# Patient Record
Sex: MALE | Race: WHITE | Employment: FULL TIME | URBAN - METROPOLITAN AREA
[De-identification: names, ages, dates, MRNs, and addresses within clinical notes are randomized per-mention and may not be internally consistent; named-entity substitution may affect disease eponyms.]

---

## 2018-05-14 PROCEDURE — 88304 TISSUE EXAM BY PATHOLOGIST: CPT | Performed by: SURGERY

## 2021-10-02 ENCOUNTER — LAB ENCOUNTER (OUTPATIENT)
Dept: LAB | Facility: HOSPITAL | Age: 51
End: 2021-10-02
Attending: INTERNAL MEDICINE
Payer: COMMERCIAL

## 2021-10-02 DIAGNOSIS — Z01.818 PRE-OP TESTING: ICD-10-CM

## 2021-10-05 PROBLEM — D12.2 BENIGN NEOPLASM OF ASCENDING COLON: Status: ACTIVE | Noted: 2021-10-05

## 2021-10-05 PROBLEM — Z12.11 SPECIAL SCREENING FOR MALIGNANT NEOPLASM OF COLON: Status: ACTIVE | Noted: 2021-10-05

## 2021-10-05 PROBLEM — D12.5 BENIGN NEOPLASM OF SIGMOID COLON: Status: ACTIVE | Noted: 2021-10-05

## 2022-03-21 ENCOUNTER — TELEMEDICINE (OUTPATIENT)
Dept: INTERNAL MEDICINE CLINIC | Facility: CLINIC | Age: 52
End: 2022-03-21
Payer: COMMERCIAL

## 2022-03-21 DIAGNOSIS — L03.114 CELLULITIS OF LEFT HAND: Primary | ICD-10-CM

## 2022-03-21 PROBLEM — D12.5 BENIGN NEOPLASM OF SIGMOID COLON: Status: RESOLVED | Noted: 2021-10-05 | Resolved: 2022-03-21

## 2022-03-21 PROBLEM — Z86.0100 HISTORY OF COLON POLYPS: Status: ACTIVE | Noted: 2022-03-21

## 2022-03-21 PROBLEM — Z12.11 SPECIAL SCREENING FOR MALIGNANT NEOPLASM OF COLON: Status: RESOLVED | Noted: 2021-10-05 | Resolved: 2022-03-21

## 2022-03-21 PROBLEM — D12.2 BENIGN NEOPLASM OF ASCENDING COLON: Status: RESOLVED | Noted: 2021-10-05 | Resolved: 2022-03-21

## 2022-03-21 PROBLEM — Z86.010 HISTORY OF COLON POLYPS: Status: ACTIVE | Noted: 2022-03-21

## 2022-03-21 PROCEDURE — 99203 OFFICE O/P NEW LOW 30 MIN: CPT | Performed by: INTERNAL MEDICINE

## 2022-03-21 RX ORDER — CEPHALEXIN 500 MG/1
500 CAPSULE ORAL 3 TIMES DAILY
Qty: 21 CAPSULE | Refills: 0 | Status: SHIPPED | OUTPATIENT
Start: 2022-03-21

## 2022-03-24 ENCOUNTER — TELEPHONE (OUTPATIENT)
Dept: INTERNAL MEDICINE CLINIC | Facility: CLINIC | Age: 52
End: 2022-03-24

## 2022-03-24 RX ORDER — PREDNISONE 20 MG/1
40 TABLET ORAL DAILY
Qty: 10 TABLET | Refills: 0 | Status: SHIPPED | OUTPATIENT
Start: 2022-03-24 | End: 2022-03-29

## 2022-03-24 RX ORDER — DOXYCYCLINE HYCLATE 100 MG/1
100 CAPSULE ORAL 2 TIMES DAILY
Qty: 14 CAPSULE | Refills: 0 | Status: SHIPPED | OUTPATIENT
Start: 2022-03-24 | End: 2022-03-31

## 2022-03-24 NOTE — TELEPHONE ENCOUNTER
Pt had a virtual visit earlier this week for cellulitis   Took meds for it beginning Monday. Now patient has a significant rash on his face      Please call to advise.

## 2022-03-24 NOTE — TELEPHONE ENCOUNTER
Spoke to pt, cellulitis has gotten better, swelling and redness has improved, not completely gone but developed red spots/rash on his face.   Dr. Pereyra Manual recommended starting prednisone for allergic reaction and change ABT to Doxycycline  Notified pt, understanding verbalized  Allergy noted to Keflex

## 2023-07-22 ENCOUNTER — ORDER TRANSCRIPTION (OUTPATIENT)
Dept: ADMINISTRATIVE | Facility: HOSPITAL | Age: 53
End: 2023-07-22

## 2023-07-22 DIAGNOSIS — Z13.6 SCREENING FOR CARDIOVASCULAR CONDITION: Primary | ICD-10-CM

## 2023-07-24 ENCOUNTER — HOSPITAL ENCOUNTER (OUTPATIENT)
Dept: CT IMAGING | Facility: HOSPITAL | Age: 53
End: 2023-07-24
Attending: INTERNAL MEDICINE

## 2023-07-24 VITALS — DIASTOLIC BLOOD PRESSURE: 60 MMHG | SYSTOLIC BLOOD PRESSURE: 100 MMHG

## 2023-07-24 DIAGNOSIS — Z13.6 SCREENING FOR CARDIOVASCULAR CONDITION: ICD-10-CM

## 2023-07-24 PROBLEM — I77.810 DILATATION OF THORACIC AORTA: Status: ACTIVE | Noted: 2023-07-24

## 2023-07-24 PROBLEM — I77.810 DILATATION OF THORACIC AORTA (HCC): Status: ACTIVE | Noted: 2023-07-24

## 2023-07-24 NOTE — PROGRESS NOTES
Date of Service 7/24/2023    COTREZ CHURCH  Date of Birth 7/8/1970    Patient Age: 48year old    PCP: Kristy Zuniga MD  [address] [address]    Heart Scan Consult  Preliminary Heart Scan Score: 0    Previous Screening  Heart Scan Completed Previously: No                   Risk Factors  Personal Risk Factors  Non-alterable Risk Factors: Personal History;Age;Family History;Gender          Blood Pressure     /60 (BP Location: Right arm)     (Normal =< 120/80,  Elevated = 120-129/ >80,  High Stage1 130-139/80-89 , Stage2 >140/>90)    Lipid Profile  Cholesterol: 191, done on 11/30/2021. HDL Cholesterol: 69, done on 11/30/2021. LDL Cholesterol: 106, done on 11/30/2021. TriGlycerides 79, done on 11/30/2021. Cholesterol Goals  Value   Total  =< 200   HDL  = > 45 Men = > 55 Women   LDL   =< 100   Triglycerides  =< 150       Glucose and Hemoglobin A1C  Lab Results   Component Value Date     (H) 11/30/2021    A1C 6.8 (H) 11/30/2021     (Normal Fasting Glucose < 100mg/dl )    Nurse Review  Risk factor information and results reviewed with Nurse: Yes    Recommended Follow Up:  Consult your physician regarding[de-identified] Final Heart Scan Report; Discuss potential for Incidental Finding      Recommendations for Change:  Nutrition Changes: Low Saturated Fat;Low Fat Dairy; Increase Fiber    Cholesterol Modification (goal of therapy depends upon your risk): No Change Needed     (Today's FASTING Cholestech Values: Total Cholesterol-165, HDL-72, LDL-83, Triglycerides-52, Glucose-90)    Exercise: Enhance Current Program                   Repeat Heart Scan: 5 years if Calcium Score is 0.0         Other[de-identified] Your Cholesterol values look great! Keep up the good work Ethan Wesley! Jaime Recommended Resources:  Recommended Resources: Upcoming Classes, Medical Services and [facility]. Health. Chey Potts RN        Please Contact the Nurse Heart Line with any Questions or Concerns [phone].

## 2023-08-07 ENCOUNTER — TELEPHONE (OUTPATIENT)
Dept: OTHER | Facility: HOSPITAL | Age: 53
End: 2023-08-07

## 2023-08-07 NOTE — PROGRESS NOTES
Pt had a heart scan on 7/24/2023 with a score of zero. Incidental finding noted of dilated thoracic aorta of 4.1cm. Call to pt to discuss. Received a VM. LMTCB.

## 2024-01-28 ENCOUNTER — MOBILE ENCOUNTER (OUTPATIENT)
Dept: INTERNAL MEDICINE CLINIC | Facility: CLINIC | Age: 54
End: 2024-01-28

## 2024-01-28 DIAGNOSIS — L73.9 FOLLICULITIS: Primary | ICD-10-CM

## 2024-01-28 RX ORDER — DOXYCYCLINE HYCLATE 100 MG/1
100 CAPSULE ORAL 2 TIMES DAILY
Qty: 20 CAPSULE | Refills: 0 | Status: SHIPPED | OUTPATIENT
Start: 2024-01-28

## 2024-10-18 ENCOUNTER — TELEPHONE (OUTPATIENT)
Dept: INTERNAL MEDICINE CLINIC | Facility: CLINIC | Age: 54
End: 2024-10-18

## 2024-10-18 DIAGNOSIS — E10.29 TYPE 1 DIABETES MELLITUS WITH MICROALBUMINURIA (HCC): ICD-10-CM

## 2024-10-18 DIAGNOSIS — R80.9 TYPE 1 DIABETES MELLITUS WITH MICROALBUMINURIA (HCC): ICD-10-CM

## 2024-10-18 DIAGNOSIS — Z12.5 PROSTATE CANCER SCREENING: ICD-10-CM

## 2024-10-18 DIAGNOSIS — Z00.00 LABORATORY EXAMINATION ORDERED AS PART OF A ROUTINE GENERAL MEDICAL EXAMINATION: Primary | ICD-10-CM

## 2024-10-18 NOTE — TELEPHONE ENCOUNTER
Preferred Lab: [facility]  LOV: 03/21/2022  Next OV & Reason:  10/24/2024-physical    Patient was notified to fast 8-10 hours drinking only water/black coffee prior to having labs drawn and may need to submit urine sample.  Hemoglobin A1C will be ordered if patient has diabetes or prediabetes.  Lab orders will be placed by end of day:  yes   Patient requesting A1C: yes  Patient informed insurance may not cover A1C and they may be responsible for cost if denied by insurance:  yes  Patient requesting return call:  no

## 2024-10-24 ENCOUNTER — OFFICE VISIT (OUTPATIENT)
Dept: INTERNAL MEDICINE CLINIC | Facility: CLINIC | Age: 54
End: 2024-10-24
Payer: COMMERCIAL

## 2024-10-24 VITALS
RESPIRATION RATE: 16 BRPM | WEIGHT: 186 LBS | HEART RATE: 82 BPM | SYSTOLIC BLOOD PRESSURE: 114 MMHG | HEIGHT: 73 IN | TEMPERATURE: 98 F | OXYGEN SATURATION: 99 % | BODY MASS INDEX: 24.65 KG/M2 | DIASTOLIC BLOOD PRESSURE: 72 MMHG

## 2024-10-24 DIAGNOSIS — R80.9 TYPE 1 DIABETES MELLITUS WITH MICROALBUMINURIA (HCC): ICD-10-CM

## 2024-10-24 DIAGNOSIS — E10.29 TYPE 1 DIABETES MELLITUS WITH MICROALBUMINURIA (HCC): ICD-10-CM

## 2024-10-24 DIAGNOSIS — Z23 NEED FOR INFLUENZA VACCINATION: ICD-10-CM

## 2024-10-24 DIAGNOSIS — Z23 NEED FOR PNEUMOCOCCAL VACCINATION: ICD-10-CM

## 2024-10-24 DIAGNOSIS — Z00.00 ANNUAL PHYSICAL EXAM: Primary | ICD-10-CM

## 2024-10-24 DIAGNOSIS — I77.810 DILATATION OF THORACIC AORTA (HCC): ICD-10-CM

## 2024-10-24 PROCEDURE — 90677 PCV20 VACCINE IM: CPT | Performed by: INTERNAL MEDICINE

## 2024-10-24 PROCEDURE — 90472 IMMUNIZATION ADMIN EACH ADD: CPT | Performed by: INTERNAL MEDICINE

## 2024-10-24 PROCEDURE — 90471 IMMUNIZATION ADMIN: CPT | Performed by: INTERNAL MEDICINE

## 2024-10-24 PROCEDURE — 90656 IIV3 VACC NO PRSV 0.5 ML IM: CPT | Performed by: INTERNAL MEDICINE

## 2024-10-24 PROCEDURE — 99396 PREV VISIT EST AGE 40-64: CPT | Performed by: INTERNAL MEDICINE

## 2024-10-24 RX ORDER — ATORVASTATIN CALCIUM 20 MG/1
20 TABLET, FILM COATED ORAL DAILY
Qty: 90 TABLET | Refills: 3 | Status: SHIPPED | OUTPATIENT
Start: 2024-10-24

## 2024-10-24 RX ORDER — LEVOTHYROXINE SODIUM 137 UG/1
137 TABLET ORAL
COMMUNITY

## 2024-10-24 NOTE — PROGRESS NOTES
Subjective:   Patient ID: Kali Mehta is a 54 year old male.    HPI  Kali Mehta is a 54 year old male who presents for a complete physical exam.   HPI:   Pt complains of nothing  Sees endo for DM1 management. [facility]  He hasn't been taking statin for months.  Optho at [facility]  Denies cp or sob    Had recent CT utrafast:  On overread:  Impression   CONCLUSION:  Fusiform dilation of the ascending thoracic aorta with caliber of 4.1 cm.        PAST MEDICAL, SOCIAL, FAMILY HISTORIES REVIEWED WITH PT  .    Immunization History   Administered Date(s) Administered    Covid-19 Vaccine Moderna 100 mcg/0.5 ml 03/15/2021, 04/12/2021, 10/27/2021    Covid-19 Vaccine Pfizer Bivalent 30mcg/0.3mL 11/14/2022    FLU VAC QIV SPLIT 3 YRS AND OLDER (45672) 11/17/2017    FLUZONE 6 months and older PFS 0.5 ml (10535) 11/27/2022    Fluvirin, 3 Years & >, Im 10/27/2021    Influenza 11/27/2022    TDAP 11/08/2017     Wt Readings from Last 6 Encounters:   10/07/24 185 lb (83.9 kg)   02/02/22 190 lb (86.2 kg)   11/30/21 190 lb (86.2 kg)   10/01/21 188 lb (85.3 kg)   11/11/19 190 lb 8 oz (86.4 kg)   08/31/18 189 lb 4 oz (85.8 kg)     There is no height or weight on file to calculate BMI.     Lab Results   Component Value Date     (H) 11/30/2021     (H) 08/31/2018     Lab Results   Component Value Date    CHOLEST 191.00 11/30/2021     Lab Results   Component Value Date    HDL 69 11/30/2021     Lab Results   Component Value Date     11/30/2021     Lab Results   Component Value Date    AST 30 11/30/2021    AST 28 08/31/2018     Lab Results   Component Value Date    ALT 14 11/30/2021    ALT 23 08/31/2018     Lab Results   Component Value Date    PSA 0.850 11/30/2021    PSA 0.80 08/31/2018        Current Outpatient Medications   Medication Sig Dispense Refill    PEG 3350-KCl-Na Bicarb-NaCl 420 g Oral Recon Soln Take as directed by physician 4000 mL 0    doxycycline 100 MG Oral Cap Take 1 capsule (100 mg  total) by mouth 2 (two) times daily. 20 capsule 0    Continuous Blood Gluc Transmit (DEXCOM G6 TRANSMITTER) Does not apply Misc Use one every 90 days 1 each 3    Continuous Blood Gluc Sensor (DEXCOM G6 SENSOR) Does not apply Misc 1 each Every 10 days. 9 each 3    LEVOTHYROXINE 150 MCG Oral Tab TAKE 1 TABLET BY MOUTH DAILY 90 tablet 3    FREESTYLE TEST In Vitro Strip TEST BLOOD GLUCOSE LEVEL 6 TIMES DAILY 200 strip 3    Glucagon (GVOKE HYPOPEN 2-PACK) 1 MG/0.2ML Subcutaneous Solution Auto-injector Inject 1 mg into the skin as needed. 0.4 mL 3    insulin glargine (LANTUS) 100 UNIT/ML Subcutaneous Solution 20 units daily as needed for pump malfunction 10 mL 3    Insulin Syringes, Disposable, U-100 1 ML Does not apply Misc As needed for pump malfunction 100 each 3    Insulin Lispro (HUMALOG) 100 UNIT/ML Subcutaneous Solution 50u daily through pump 10 mL 3    Fluticasone Propionate (FLONASE) 50 MCG/ACT Nasal Suspension 2 sprays by Nasal route daily. 1 Bottle 3    atorvastatin 10 MG Oral Tab Take 1 tablet (10 mg total) by mouth daily.        Past Medical History:    DIABETES    Type 1    Diabetes mellitus (HCC)    Fatigue    Hearing loss    Hemorrhoids    HYPOTHYROIDISM    Thyroid disease    Wears glasses      Past Surgical History:   Procedure Laterality Date    Colonoscopy      Tonsillectomy        Family History   Problem Relation Age of Onset    No Known Problems Father     Ulcerative Colitis Mother     Diabetes Maternal Grandmother     No Known Problems Paternal Grandmother     No Known Problems Brother     No Known Problems Brother     Alcohol and Other Disorders Associated Maternal Uncle     Cancer Neg     Heart Disorder Neg     Hypertension Neg     Lipids Neg     Obesity Neg     Psychiatric Neg       Social History:  Social History     Socioeconomic History    Marital status:    Tobacco Use    Smoking status: Never    Smokeless tobacco: Never   Vaping Use    Vaping status: Never Used   Substance and Sexual  Activity    Alcohol use: Yes     Alcohol/week: 6.0 standard drinks of alcohol     Types: 6 Cans of beer per week     Comment: social     Drug use: No      Occ: yes. : yes. Children: yes.   Exercise: three times per week.  Diet: watches fats closely and watches sugar closely     REVIEW OF SYSTEMS:   A comprehensive 10 point review of systems was completed.     Pertinent positives and negatives noted in the HPI.      EXAM:   There were no vitals taken for this visit.  There is no height or weight on file to calculate BMI.   GENERAL: well developed, well nourished,in no apparent distress  SKIN: no rashes,no suspicious lesions  HEENT: atraumatic, normocephalic,ears and throat are clear  EYES:PERRLA, EOMI,,conjunctiva are clear  NECK: supple,no adenopathy,no bruits  LUNGS: clear to auscultation  CARDIO: RRR without murmur  GI: good BS's,no masses, HSM or tenderness  : deferred  MUSCULOSKELETAL: back is not tender  EXTREMITIES: no cyanosis, clubbing or edema  NEURO: Oriented times three,motor and sensory are grossly intact, Bilateral barefoot skin diabetic exam is normal, visualized feet and the appearance is normal.  Bilateral monofilament/sensation of both feet is normal.  Pulsation pedal pulse exam of both lower legs/feet is normal as well.      ASSESSMENT AND PLAN:   Kali Mehta is a 54 year old male who presents for a complete physical exam.  There is no height or weight on file to calculate BMI., recommended low fat diet and aerobic exercise 30 minutes three times weekly.   Health maintenance, will check fasting Lipids, CMP, CBC and PSA and A1c   Pt referred for screening colonoscopy.     DM1- controlled. Cont follow up with  [facility]. Restart atorvastatin  Refer to cardio regarding descending thoracic aorta dilation, check CTA chest and ECHO     Update prevnar  Pt info handouts given for: exercise, low fat diet  The patient indicates understanding of these issues and agrees to the plan.    The patient is  asked to return for CPX in 12 m.    History/Other:   Review of Systems  Current Outpatient Medications   Medication Sig Dispense Refill    PEG 3350-KCl-Na Bicarb-NaCl 420 g Oral Recon Soln Take as directed by physician 4000 mL 0    doxycycline 100 MG Oral Cap Take 1 capsule (100 mg total) by mouth 2 (two) times daily. 20 capsule 0    Continuous Blood Gluc Transmit (DEXCOM G6 TRANSMITTER) Does not apply Misc Use one every 90 days 1 each 3    Continuous Blood Gluc Sensor (DEXCOM G6 SENSOR) Does not apply Misc 1 each Every 10 days. 9 each 3    LEVOTHYROXINE 150 MCG Oral Tab TAKE 1 TABLET BY MOUTH DAILY 90 tablet 3    FREESTYLE TEST In Vitro Strip TEST BLOOD GLUCOSE LEVEL 6 TIMES DAILY 200 strip 3    Glucagon (GVOKE HYPOPEN 2-PACK) 1 MG/0.2ML Subcutaneous Solution Auto-injector Inject 1 mg into the skin as needed. 0.4 mL 3    insulin glargine (LANTUS) 100 UNIT/ML Subcutaneous Solution 20 units daily as needed for pump malfunction 10 mL 3    Insulin Syringes, Disposable, U-100 1 ML Does not apply Misc As needed for pump malfunction 100 each 3    Insulin Lispro (HUMALOG) 100 UNIT/ML Subcutaneous Solution 50u daily through pump 10 mL 3    Fluticasone Propionate (FLONASE) 50 MCG/ACT Nasal Suspension 2 sprays by Nasal route daily. 1 Bottle 3    atorvastatin 10 MG Oral Tab Take 1 tablet (10 mg total) by mouth daily.       Allergies:Allergies[1]    Objective:   Physical Exam    Assessment & Plan:   1. Annual physical exam        No orders of the defined types were placed in this encounter.      Meds This Visit:  Requested Prescriptions      No prescriptions requested or ordered in this encounter       Imaging & Referrals:  None         [1]   Allergies  Allergen Reactions    Penicillin G HIVES    Keflex [Cephalexin] RASH

## 2024-11-06 ENCOUNTER — HOSPITAL ENCOUNTER (OUTPATIENT)
Dept: CV DIAGNOSTICS | Facility: HOSPITAL | Age: 54
Discharge: HOME | End: 2024-11-06
Attending: INTERNAL MEDICINE
Payer: COMMERCIAL

## 2024-11-06 ENCOUNTER — HOSPITAL ENCOUNTER (OUTPATIENT)
Dept: CT IMAGING | Facility: HOSPITAL | Age: 54
Discharge: HOME | End: 2024-11-06
Attending: INTERNAL MEDICINE
Payer: COMMERCIAL

## 2024-11-06 DIAGNOSIS — I77.810 DILATATION OF THORACIC AORTA (HCC): ICD-10-CM

## 2024-11-06 DIAGNOSIS — R80.9 TYPE 1 DIABETES MELLITUS WITH MICROALBUMINURIA (HCC): ICD-10-CM

## 2024-11-06 DIAGNOSIS — E10.29 TYPE 1 DIABETES MELLITUS WITH MICROALBUMINURIA (HCC): ICD-10-CM

## 2024-11-06 LAB
CREAT BLD-MCNC: 1.3 MG/DL
EGFRCR SERPLBLD CKD-EPI 2021: 65 ML/MIN/1.73M2 (ref 60–?)

## 2024-11-06 PROCEDURE — 93306 TTE W/DOPPLER COMPLETE: CPT | Performed by: INTERNAL MEDICINE

## 2024-11-06 PROCEDURE — 82565 ASSAY OF CREATININE: CPT

## 2024-11-06 PROCEDURE — 71275 CT ANGIOGRAPHY CHEST: CPT | Performed by: INTERNAL MEDICINE

## 2024-12-06 ENCOUNTER — TELEPHONE (OUTPATIENT)
Dept: INTERNAL MEDICINE CLINIC | Facility: CLINIC | Age: 54
End: 2024-12-06

## 2024-12-06 DIAGNOSIS — J06.9 UPPER RESPIRATORY TRACT INFECTION, UNSPECIFIED TYPE: Primary | ICD-10-CM

## 2024-12-06 RX ORDER — PREDNISONE 20 MG/1
40 TABLET ORAL DAILY
Qty: 10 TABLET | Refills: 0 | Status: SHIPPED | OUTPATIENT
Start: 2024-12-06 | End: 2024-12-11

## 2024-12-06 RX ORDER — AZITHROMYCIN 250 MG/1
TABLET, FILM COATED ORAL
Qty: 6 TABLET | Refills: 0 | Status: SHIPPED | OUTPATIENT
Start: 2024-12-06 | End: 2024-12-11

## 2024-12-06 NOTE — TELEPHONE ENCOUNTER
Patient spoke to Dr. Denney regarding ongoing cough. Spoke to patient- ongoing cough with chest congestion.     Per Dr. Denney- Can prescribe Zpak and Prednisone 40 mg x 5 days. Advised patient to contact office to schedule appt next week if symptoms are not resolved. Scripts sent to pharmacy.

## 2024-12-10 ENCOUNTER — TELEPHONE (OUTPATIENT)
Dept: INTERNAL MEDICINE CLINIC | Facility: CLINIC | Age: 54
End: 2024-12-10

## 2024-12-10 ENCOUNTER — MOBILE ENCOUNTER (OUTPATIENT)
Dept: INTERNAL MEDICINE CLINIC | Facility: CLINIC | Age: 54
End: 2024-12-10

## 2024-12-10 ENCOUNTER — HOSPITAL ENCOUNTER (OUTPATIENT)
Dept: GENERAL RADIOLOGY | Facility: HOSPITAL | Age: 54
Discharge: HOME | End: 2024-12-10
Attending: INTERNAL MEDICINE
Payer: COMMERCIAL

## 2024-12-10 DIAGNOSIS — R05.1 ACUTE COUGH: ICD-10-CM

## 2024-12-10 DIAGNOSIS — R06.2 WHEEZE: Primary | ICD-10-CM

## 2024-12-10 DIAGNOSIS — R06.2 WHEEZE: ICD-10-CM

## 2024-12-10 PROCEDURE — 71045 X-RAY EXAM CHEST 1 VIEW: CPT | Performed by: INTERNAL MEDICINE

## 2024-12-10 NOTE — TELEPHONE ENCOUNTER
Pt needing Dexcom 6, sensors only, he has upcoming xray and he will have to take off - he needs to CXR soon due to congestion and URI     Pt states there is a delay on   Insulin Lispro (HUMALOG) 100 UNIT/ML Subcutaneous Solution     He believes it is due to  Authorization, not stock. He also said the pharmacy is needing auth for the sensors.    Please call patient when ordered at [phone]

## 2024-12-10 NOTE — TELEPHONE ENCOUNTER
CXR order already placed today by Dr. Denney.     Diabetic supplies and diabetic medications are managed by patient's endocrinologist- Dr. Beverly Servin. Forwarded back to front office.

## 2024-12-12 ENCOUNTER — PATIENT MESSAGE (OUTPATIENT)
Dept: INTERNAL MEDICINE CLINIC | Facility: CLINIC | Age: 54
End: 2024-12-12

## 2024-12-12 DIAGNOSIS — R06.2 WHEEZE: ICD-10-CM

## 2024-12-12 DIAGNOSIS — R05.1 ACUTE COUGH: Primary | ICD-10-CM

## 2024-12-12 RX ORDER — ALBUTEROL SULFATE 90 UG/1
2 INHALANT RESPIRATORY (INHALATION) EVERY 6 HOURS PRN
Qty: 1 EACH | Refills: 0 | Status: SHIPPED | OUTPATIENT
Start: 2024-12-12

## 2024-12-30 ENCOUNTER — OFFICE VISIT (OUTPATIENT)
Dept: INTERNAL MEDICINE CLINIC | Facility: CLINIC | Age: 54
End: 2024-12-30
Payer: COMMERCIAL

## 2024-12-30 VITALS
RESPIRATION RATE: 16 BRPM | SYSTOLIC BLOOD PRESSURE: 118 MMHG | BODY MASS INDEX: 24.65 KG/M2 | TEMPERATURE: 98 F | DIASTOLIC BLOOD PRESSURE: 74 MMHG | HEART RATE: 61 BPM | HEIGHT: 73 IN | OXYGEN SATURATION: 100 % | WEIGHT: 186 LBS

## 2024-12-30 DIAGNOSIS — J01.00 ACUTE NON-RECURRENT MAXILLARY SINUSITIS: Primary | ICD-10-CM

## 2024-12-30 DIAGNOSIS — J01.00 ACUTE NON-RECURRENT MAXILLARY SINUSITIS: ICD-10-CM

## 2024-12-30 PROCEDURE — 99214 OFFICE O/P EST MOD 30 MIN: CPT | Performed by: INTERNAL MEDICINE

## 2024-12-30 RX ORDER — DOXYCYCLINE 100 MG/1
100 CAPSULE ORAL 2 TIMES DAILY
Qty: 20 CAPSULE | Refills: 0 | OUTPATIENT
Start: 2024-12-30

## 2024-12-30 RX ORDER — DOXYCYCLINE 100 MG/1
100 CAPSULE ORAL 2 TIMES DAILY
Qty: 20 CAPSULE | Refills: 0 | Status: SHIPPED | OUTPATIENT
Start: 2024-12-30

## 2024-12-30 NOTE — PROGRESS NOTES
Subjective:   Patient ID: Kali Mehta is a 54 year old male.    Sinus Problem  This is a recurrent problem. The current episode started more than 1 month ago. The problem has been gradually worsening since onset. There has been no fever. The pain is moderate. Associated symptoms include congestion, coughing (non productive), sinus pressure and swollen glands. Pertinent negatives include no diaphoresis, ear pain, headaches, hoarse voice, neck pain, shortness of breath or sore throat. Past treatments include nasal decongestants and saline nose sprays. The treatment provided mild relief.       History/Other:   Review of Systems   Constitutional:  Negative for diaphoresis.   HENT:  Positive for congestion and sinus pressure. Negative for ear pain, hoarse voice and sore throat.    Respiratory:  Positive for cough (non productive). Negative for shortness of breath.    Musculoskeletal:  Negative for neck pain.   Neurological:  Negative for headaches.   All other systems reviewed and are negative.    Current Outpatient Medications   Medication Sig Dispense Refill    doxycycline 100 MG Oral Cap Take 1 capsule (100 mg total) by mouth 2 (two) times daily. 20 capsule 0    albuterol 108 (90 Base) MCG/ACT Inhalation Aero Soln Inhale 2 puffs into the lungs every 6 (six) hours as needed for Wheezing. 1 each 0    levothyroxine 137 MCG Oral Tab Take 137 mcg by mouth before breakfast.      atorvastatin 20 MG Oral Tab Take 1 tablet (20 mg total) by mouth daily. 90 tablet 3    Continuous Blood Gluc Transmit (DEXCOM G6 TRANSMITTER) Does not apply Misc Use one every 90 days 1 each 3    Continuous Blood Gluc Sensor (DEXCOM G6 SENSOR) Does not apply Misc 1 each Every 10 days. 9 each 3    FREESTYLE TEST In Vitro Strip TEST BLOOD GLUCOSE LEVEL 6 TIMES DAILY 200 strip 3    Glucagon (GVOKE HYPOPEN 2-PACK) 1 MG/0.2ML Subcutaneous Solution Auto-injector Inject 1 mg into the skin as needed. 0.4 mL 3    insulin glargine (LANTUS) 100 UNIT/ML  Subcutaneous Solution 20 units daily as needed for pump malfunction 10 mL 3    Insulin Syringes, Disposable, U-100 1 ML Does not apply Misc As needed for pump malfunction 100 each 3    Insulin Lispro (HUMALOG) 100 UNIT/ML Subcutaneous Solution 50u daily through pump 10 mL 3    Fluticasone Propionate (FLONASE) 50 MCG/ACT Nasal Suspension 2 sprays by Nasal route daily. 1 Bottle 3    PEG 3350-KCl-Na Bicarb-NaCl 420 g Oral Recon Soln Take as directed by physician (Patient not taking: Reported on 12/30/2024) 4000 mL 0     Allergies:Allergies[1]    Objective:   Physical Exam  Vitals and nursing note reviewed.   Constitutional:       Appearance: Normal appearance.   HENT:      Head: Normocephalic and atraumatic.      Right Ear: Hearing and tympanic membrane normal.      Left Ear: Hearing and tympanic membrane normal.      Nose:      Right Turbinates: Swollen.      Left Turbinates: Swollen.      Right Sinus: Maxillary sinus tenderness present.      Left Sinus: Maxillary sinus tenderness present.      Mouth/Throat:      Tongue: No lesions.      Palate: No mass.      Pharynx: Posterior oropharyngeal erythema present. No pharyngeal swelling.   Cardiovascular:      Rate and Rhythm: Regular rhythm.      Pulses: Normal pulses.      Heart sounds: Normal heart sounds. No murmur heard.  Pulmonary:      Effort: Pulmonary effort is normal.      Breath sounds: Normal breath sounds.   Abdominal:      General: Abdomen is flat. Bowel sounds are normal.      Palpations: Abdomen is soft.   Musculoskeletal:      Cervical back: Normal range of motion and neck supple.   Neurological:      Mental Status: He is alert.         Assessment & Plan:   1. Acute non-recurrent maxillary sinusitis      - finish doxy as ordered  No orders of the defined types were placed in this encounter.      Meds This Visit:  Requested Prescriptions     Signed Prescriptions Disp Refills    doxycycline 100 MG Oral Cap 20 capsule 0     Sig: Take 1 capsule (100 mg total)  by mouth 2 (two) times daily.       Imaging & Referrals:  None         [1]   Allergies  Allergen Reactions    Penicillin G HIVES    Keflex [Cephalexin] RASH

## 2025-03-18 PROBLEM — Z86.0101 PERSONAL HISTORY OF ADENOMATOUS AND SERRATED COLON POLYPS: Status: ACTIVE | Noted: 2025-03-18

## 2025-03-18 PROBLEM — D12.3 BENIGN NEOPLASM OF TRANSVERSE COLON: Status: ACTIVE | Noted: 2025-03-18

## 2025-06-30 ENCOUNTER — TELEPHONE (OUTPATIENT)
Dept: INTERNAL MEDICINE CLINIC | Facility: CLINIC | Age: 55
End: 2025-06-30

## 2025-06-30 NOTE — TELEPHONE ENCOUNTER
Reached patient for medication adherence consult. Patient overdue for refill on atorvastatin per insurance report.Patient recently refilled atorvastatin on 6/27 x 30 days.     Patient reports that he is taking the medication as prescribed. He reports tolerating the medication well. He denies the need for refills at this time. He states that he had accumulated a surplus of medication and worked through that supply prior to re-ordering his prescription.     Provided education on importance of adherence. Patient denies any questions or concerns with medication.

## (undated) DIAGNOSIS — R21 RASH: ICD-10-CM

## (undated) DIAGNOSIS — L03.114 CELLULITIS OF LEFT HAND: Primary | ICD-10-CM